# Patient Record
Sex: FEMALE | Race: WHITE | Employment: FULL TIME | URBAN - NONMETROPOLITAN AREA
[De-identification: names, ages, dates, MRNs, and addresses within clinical notes are randomized per-mention and may not be internally consistent; named-entity substitution may affect disease eponyms.]

---

## 2019-06-24 ENCOUNTER — OFFICE VISIT (OUTPATIENT)
Dept: FAMILY MEDICINE CLINIC | Age: 29
End: 2019-06-24
Payer: COMMERCIAL

## 2019-06-24 VITALS
BODY MASS INDEX: 35.84 KG/M2 | TEMPERATURE: 97 F | HEIGHT: 69 IN | SYSTOLIC BLOOD PRESSURE: 118 MMHG | HEART RATE: 70 BPM | WEIGHT: 242 LBS | OXYGEN SATURATION: 97 % | DIASTOLIC BLOOD PRESSURE: 84 MMHG

## 2019-06-24 DIAGNOSIS — L23.7 ALLERGIC DERMATITIS DUE TO POISON IVY: Primary | ICD-10-CM

## 2019-06-24 PROCEDURE — 99212 OFFICE O/P EST SF 10 MIN: CPT | Performed by: NURSE PRACTITIONER

## 2019-06-24 RX ORDER — METHYLPREDNISOLONE 4 MG/1
TABLET ORAL
Qty: 1 KIT | Refills: 0 | Status: SHIPPED | OUTPATIENT
Start: 2019-06-24 | End: 2019-06-25

## 2019-06-24 NOTE — PROGRESS NOTES
Subjective:  Chief Complaint   Patient presents with    Rash     possible poison ivy both legs face left hand started yesterday itches       HPI:  Patient states rash started 3 days ago. Patient was outside but not sure when she was exposed. The rash is pruritic in nature. No pain. The patient denies any other food, drug and or environmental allergens. Denies throat swelling or dyspnea. Denies fever or chills. Patient has been using OTC medications without improvement in symptoms. The patient has had previous episodes of poison ivy and this feels the same. Denies chance of pregnancy. ROS:  Positive and pertinent negatives as per HPI. All other systems are reviewed and negative. Current Outpatient Medications:     methylPREDNISolone (MEDROL DOSEPACK) 4 MG tablet, Take by mouth., Disp: 1 kit, Rfl: 0   No Known Allergies     Objective:  Vitals:    06/24/19 0917   BP: 118/84   Site: Left Upper Arm   Position: Sitting   Cuff Size: Large Adult   Pulse: 70   Temp: 97 °F (36.1 °C)   TempSrc: Temporal   SpO2: 97%   Weight: 242 lb (109.8 kg)   Height: 5' 9\" (1.753 m)        Exam:  Const: Appears healthy and well developed. No signs of acute distress present. Vitals reviewed per triage. Head/Face: Normocephalic, atraumatic. Facies is symmetric. ENMT:  Nares are patent. Buccal mucosa is moist.  No inflammation or edema of the posterior oropharynx. Neck: Trachea midline. Resp: No respiratory distress. Lungs clear to auscultation bilaterally all lung fieds. Musculo: Patient moves extremities without pain or limitation. Skin: Skin is warm and dry. The patient has multiple linear vesicular areas noted to the trunk and extremities. There is no evidence of petechiae or purpura rash. No pustules or open areas or signs of secondary infection noted. No target lesions. Neuro: Alert and oriented x3. Speech is articulate and fluent.   Psych: Mood/Affect: Patient's mood and affect is appropriate to

## 2019-06-25 ENCOUNTER — NURSE ONLY (OUTPATIENT)
Dept: FAMILY MEDICINE CLINIC | Age: 29
End: 2019-06-25
Payer: COMMERCIAL

## 2019-06-25 VITALS
SYSTOLIC BLOOD PRESSURE: 118 MMHG | WEIGHT: 238 LBS | DIASTOLIC BLOOD PRESSURE: 82 MMHG | OXYGEN SATURATION: 95 % | TEMPERATURE: 97.5 F | HEART RATE: 70 BPM | BODY MASS INDEX: 35.25 KG/M2 | HEIGHT: 69 IN

## 2019-06-25 DIAGNOSIS — L23.7 ALLERGIC DERMATITIS DUE TO POISON IVY: Primary | ICD-10-CM

## 2019-06-25 PROCEDURE — 96372 THER/PROPH/DIAG INJ SC/IM: CPT | Performed by: NURSE PRACTITIONER

## 2019-06-25 RX ORDER — METHYLPREDNISOLONE ACETATE 40 MG/ML
40 INJECTION, SUSPENSION INTRA-ARTICULAR; INTRALESIONAL; INTRAMUSCULAR; SOFT TISSUE ONCE
Status: COMPLETED | OUTPATIENT
Start: 2019-06-25 | End: 2019-06-25

## 2019-06-25 RX ADMIN — METHYLPREDNISOLONE ACETATE 40 MG: 40 INJECTION, SUSPENSION INTRA-ARTICULAR; INTRALESIONAL; INTRAMUSCULAR; SOFT TISSUE at 09:18

## 2021-03-08 PROBLEM — N83.8 DIMINISHED OVARIAN RESERVE: Status: ACTIVE | Noted: 2021-03-08

## 2023-07-07 ENCOUNTER — OFFICE VISIT (OUTPATIENT)
Dept: FAMILY MEDICINE CLINIC | Age: 33
End: 2023-07-07

## 2023-07-07 VITALS
WEIGHT: 239 LBS | DIASTOLIC BLOOD PRESSURE: 80 MMHG | RESPIRATION RATE: 16 BRPM | HEIGHT: 69 IN | BODY MASS INDEX: 35.4 KG/M2 | SYSTOLIC BLOOD PRESSURE: 118 MMHG | TEMPERATURE: 97.3 F | OXYGEN SATURATION: 99 % | HEART RATE: 78 BPM

## 2023-07-07 DIAGNOSIS — L30.9 DERMATITIS: Primary | ICD-10-CM

## 2023-07-07 PROCEDURE — 99213 OFFICE O/P EST LOW 20 MIN: CPT | Performed by: NURSE PRACTITIONER

## 2023-07-07 PROCEDURE — 96372 THER/PROPH/DIAG INJ SC/IM: CPT | Performed by: NURSE PRACTITIONER

## 2023-07-07 RX ORDER — PREDNISONE 10 MG/1
TABLET ORAL
Qty: 18 TABLET | Refills: 0 | Status: SHIPPED | OUTPATIENT
Start: 2023-07-07 | End: 2023-07-16

## 2023-07-07 RX ORDER — TRIAMCINOLONE ACETONIDE 40 MG/ML
40 INJECTION, SUSPENSION INTRA-ARTICULAR; INTRAMUSCULAR ONCE
Status: COMPLETED | OUTPATIENT
Start: 2023-07-07 | End: 2023-07-07

## 2023-07-07 RX ADMIN — TRIAMCINOLONE ACETONIDE 40 MG: 40 INJECTION, SUSPENSION INTRA-ARTICULAR; INTRAMUSCULAR at 10:21

## 2023-07-07 NOTE — PROGRESS NOTES
23  Maria R WANG Book : 1990 Sex: female  Age 34 y.o. Subjective:  Chief Complaint   Patient presents with    Poison Ivy     Arms, stomach, foot - about 2-3 days ago        HPI:   [address] , 34 y.o. female presents to the clinic for evaluation of rash to arms / legs / abdomen x 2-3 days. The patient reports associated pruritus. The patient reports symptoms developed after doing yard work. The patient has taken Cortisone Cream for symptoms. The patient reports worsening symptoms over time. Denies any known cause for the rash including any new soaps, detergents, lotions, foods, or medications. Denies recent travel, recent illness, and ill exposure. Denies any bleeding, drainage, lymphangitic streaking, arthralgia, myalgia,or lethargy. The patient also denies headache, fever, chest pain, abdominal pain, shortness of breath, and nausea / vomiting / diarrhea. ROS:   Unless otherwise stated in this report the patient's positive and negative responses for review of systems for constitutional, eyes, ENT, cardiovascular, respiratory, gastrointestinal, neurological, , musculoskeletal, and integument systems and related systems to the presenting problem are either stated in the history of present illness or were not pertinent or were negative for the symptoms and/or complaints related to the presenting medical problem. Positives and pertinent negatives as per HPI. All others reviewed and are negative.       PMH:     Past Medical History:   Diagnosis Date    Anxiety     Depression        Past Surgical History:   Procedure Laterality Date    BACK SURGERY  2017    SPINAL FUSION  2014    WISDOM TOOTH EXTRACTION         Family History   Problem Relation Age of Onset    Diabetes Mother     Depression Mother        Medications:     Current Outpatient Medications:     predniSONE (DELTASONE) 10 MG tablet, Take 1 tablet by mouth three times daily for 3 days, THEN 1 tablet 2 times daily for 3 days, THEN 1 tablet

## 2024-04-20 ENCOUNTER — HOSPITAL ENCOUNTER (EMERGENCY)
Age: 34
Discharge: HOME | End: 2024-04-20
Payer: COMMERCIAL

## 2024-04-20 ENCOUNTER — APPOINTMENT (OUTPATIENT)
Dept: ULTRASOUND IMAGING | Age: 34
End: 2024-04-20
Payer: COMMERCIAL

## 2024-04-20 VITALS
HEIGHT: 69 IN | HEART RATE: 62 BPM | TEMPERATURE: 97.2 F | SYSTOLIC BLOOD PRESSURE: 128 MMHG | RESPIRATION RATE: 15 BRPM | WEIGHT: 225 LBS | OXYGEN SATURATION: 100 % | BODY MASS INDEX: 33.33 KG/M2 | DIASTOLIC BLOOD PRESSURE: 75 MMHG

## 2024-04-20 DIAGNOSIS — R25.2 MUSCLE CRAMPING: ICD-10-CM

## 2024-04-20 DIAGNOSIS — M79.605 BILATERAL LEG PAIN: Primary | ICD-10-CM

## 2024-04-20 DIAGNOSIS — M79.604 BILATERAL LEG PAIN: Primary | ICD-10-CM

## 2024-04-20 LAB
ANION GAP SERPL CALCULATED.3IONS-SCNC: 11 MMOL/L (ref 7–16)
BASOPHILS # BLD: 0.04 K/UL (ref 0–0.2)
BASOPHILS NFR BLD: 1 % (ref 0–2)
BILIRUB UR QL STRIP: NEGATIVE
BUN SERPL-MCNC: 18 MG/DL (ref 6–20)
CALCIUM SERPL-MCNC: 9.8 MG/DL (ref 8.6–10.2)
CHLORIDE SERPL-SCNC: 100 MMOL/L (ref 98–107)
CK SERPL-CCNC: 71 U/L (ref 20–180)
CLARITY UR: CLEAR
CO2 SERPL-SCNC: 23 MMOL/L (ref 22–29)
COLOR UR: YELLOW
COMMENT: ABNORMAL
CREAT SERPL-MCNC: 0.8 MG/DL (ref 0.5–1)
EOSINOPHIL # BLD: 0.08 K/UL (ref 0.05–0.5)
EOSINOPHILS RELATIVE PERCENT: 1 % (ref 0–6)
ERYTHROCYTE [DISTWIDTH] IN BLOOD BY AUTOMATED COUNT: 12.4 % (ref 11.5–15)
GFR SERPL CREATININE-BSD FRML MDRD: >90 ML/MIN/1.73M2
GLUCOSE SERPL-MCNC: 88 MG/DL (ref 74–99)
GLUCOSE UR STRIP-MCNC: NEGATIVE MG/DL
HCG, URINE, POC: NEGATIVE
HCT VFR BLD AUTO: 42.8 % (ref 34–48)
HGB BLD-MCNC: 14.5 G/DL (ref 11.5–15.5)
HGB UR QL STRIP.AUTO: NEGATIVE
IMM GRANULOCYTES # BLD AUTO: 0.03 K/UL (ref 0–0.58)
IMM GRANULOCYTES NFR BLD: 0 % (ref 0–5)
KETONES UR STRIP-MCNC: NEGATIVE MG/DL
LEUKOCYTE ESTERASE UR QL STRIP: NEGATIVE
LYMPHOCYTES NFR BLD: 2.7 K/UL (ref 1.5–4)
LYMPHOCYTES RELATIVE PERCENT: 32 % (ref 20–42)
Lab: NORMAL
MAGNESIUM SERPL-MCNC: 1.8 MG/DL (ref 1.6–2.6)
MCH RBC QN AUTO: 28 PG (ref 26–35)
MCHC RBC AUTO-ENTMCNC: 33.9 G/DL (ref 32–34.5)
MCV RBC AUTO: 82.6 FL (ref 80–99.9)
MONOCYTES NFR BLD: 0.63 K/UL (ref 0.1–0.95)
MONOCYTES NFR BLD: 8 % (ref 2–12)
NEGATIVE QC PASS/FAIL: NORMAL
NEUTROPHILS NFR BLD: 59 % (ref 43–80)
NEUTS SEG NFR BLD: 4.92 K/UL (ref 1.8–7.3)
NITRITE UR QL STRIP: NEGATIVE
PH UR STRIP: 5.5 [PH] (ref 5–9)
PLATELET # BLD AUTO: 250 K/UL (ref 130–450)
PMV BLD AUTO: 9.9 FL (ref 7–12)
POSITIVE QC PASS/FAIL: NORMAL
POTASSIUM SERPL-SCNC: 4 MMOL/L (ref 3.5–5)
PROT UR STRIP-MCNC: NEGATIVE MG/DL
RBC # BLD AUTO: 5.18 M/UL (ref 3.5–5.5)
SODIUM SERPL-SCNC: 134 MMOL/L (ref 132–146)
SP GR UR STRIP: >1.03 (ref 1–1.03)
UROBILINOGEN UR STRIP-ACNC: 0.2 EU/DL (ref 0–1)
WBC OTHER # BLD: 8.4 K/UL (ref 4.5–11.5)

## 2024-04-20 PROCEDURE — 85025 COMPLETE CBC W/AUTO DIFF WBC: CPT

## 2024-04-20 PROCEDURE — 99284 EMERGENCY DEPT VISIT MOD MDM: CPT

## 2024-04-20 PROCEDURE — 81003 URINALYSIS AUTO W/O SCOPE: CPT

## 2024-04-20 PROCEDURE — 96374 THER/PROPH/DIAG INJ IV PUSH: CPT

## 2024-04-20 PROCEDURE — 2580000003 HC RX 258: Performed by: NURSE PRACTITIONER

## 2024-04-20 PROCEDURE — 83735 ASSAY OF MAGNESIUM: CPT

## 2024-04-20 PROCEDURE — 82550 ASSAY OF CK (CPK): CPT

## 2024-04-20 PROCEDURE — 93970 EXTREMITY STUDY: CPT

## 2024-04-20 PROCEDURE — 80048 BASIC METABOLIC PNL TOTAL CA: CPT

## 2024-04-20 PROCEDURE — 6360000002 HC RX W HCPCS: Performed by: NURSE PRACTITIONER

## 2024-04-20 PROCEDURE — 96372 THER/PROPH/DIAG INJ SC/IM: CPT

## 2024-04-20 RX ORDER — ORPHENADRINE CITRATE 30 MG/ML
60 INJECTION INTRAMUSCULAR; INTRAVENOUS ONCE
Status: COMPLETED | OUTPATIENT
Start: 2024-04-20 | End: 2024-04-20

## 2024-04-20 RX ORDER — METFORMIN HYDROCHLORIDE 750 MG/1
750 TABLET, EXTENDED RELEASE ORAL 2 TIMES DAILY
COMMUNITY

## 2024-04-20 RX ORDER — 0.9 % SODIUM CHLORIDE 0.9 %
1000 INTRAVENOUS SOLUTION INTRAVENOUS ONCE
Status: COMPLETED | OUTPATIENT
Start: 2024-04-20 | End: 2024-04-20

## 2024-04-20 RX ORDER — METHOCARBAMOL 500 MG/1
500 TABLET, FILM COATED ORAL 3 TIMES DAILY PRN
Qty: 12 TABLET | Refills: 0 | Status: SHIPPED | OUTPATIENT
Start: 2024-04-20

## 2024-04-20 RX ORDER — NAPROXEN 500 MG/1
500 TABLET ORAL 2 TIMES DAILY PRN
Qty: 14 TABLET | Refills: 0 | Status: SHIPPED | OUTPATIENT
Start: 2024-04-20 | End: 2024-04-27

## 2024-04-20 RX ORDER — KETOROLAC TROMETHAMINE 15 MG/ML
15 INJECTION, SOLUTION INTRAMUSCULAR; INTRAVENOUS ONCE
Status: COMPLETED | OUTPATIENT
Start: 2024-04-20 | End: 2024-04-20

## 2024-04-20 RX ADMIN — ORPHENADRINE CITRATE 60 MG: 30 INJECTION, SOLUTION INTRAMUSCULAR; INTRAVENOUS at 21:32

## 2024-04-20 RX ADMIN — KETOROLAC TROMETHAMINE 15 MG: 15 INJECTION, SOLUTION INTRAMUSCULAR; INTRAVENOUS at 18:48

## 2024-04-20 RX ADMIN — SODIUM CHLORIDE 1000 ML: 9 INJECTION, SOLUTION INTRAVENOUS at 21:00

## 2024-04-20 ASSESSMENT — PAIN DESCRIPTION - ORIENTATION
ORIENTATION: RIGHT;LEFT
ORIENTATION: RIGHT;LEFT

## 2024-04-20 ASSESSMENT — PAIN SCALES - GENERAL
PAINLEVEL_OUTOF10: 7
PAINLEVEL_OUTOF10: 6
PAINLEVEL_OUTOF10: 7

## 2024-04-20 ASSESSMENT — PAIN - FUNCTIONAL ASSESSMENT
PAIN_FUNCTIONAL_ASSESSMENT: ACTIVITIES ARE NOT PREVENTED
PAIN_FUNCTIONAL_ASSESSMENT: 0-10

## 2024-04-20 ASSESSMENT — PAIN DESCRIPTION - DESCRIPTORS: DESCRIPTORS: CRAMPING

## 2024-04-20 ASSESSMENT — PAIN DESCRIPTION - LOCATION
LOCATION: LEG
LOCATION: LEG

## 2024-04-20 NOTE — ED PROVIDER NOTES
Independent [facility] Visit.            [facility] EMERGENCY DEPARTMENT  ED  Encounter Note  Admit Date/RoomTime: 2024  5:27 PM  ED Room:   NAME: Maria R Lombardi  : 1990  MRN: [id number]  PCP: Ward Gonzales AuD    CHIEF COMPLAINT     Leg Pain (Bilateral leg pain/ cramping x 1 week, abdominal lap performed . Was seen at [facility] yesterday )    HISTORY OF PRESENT ILLNESS        Maria R Lombardi is a 33 y.o. female who presents to the ED by private vehicle with spouse for bilateral leg cramping in her anterior thighs and calfs all the way down her leg, beginning 1 week(s) ago.  She reports that she went to [facility] urgent care and they sent her in for ultrasounds to rule out DVTs.  The complaint has been persistent and gradually worsening and are moderate in severity.  Patient reports she had a laparoscopic procedure last week and an IVF clinic and she feels like her muscles are swollen and having cramping and getting tingling in her feet bilaterally.  She did take tramadol on Monday and Tuesday with no improvement.  She denies any fever, chills, chest pain, shortness of breath, dizziness, leg swelling, cough, hemoptysis, back pain, night sweats or any other symptoms.  Patient has a history of having a metal herminio in her back with screws approximately 7 years ago and has had no problems with paresthesias in her lower extremities since having the surgery and reports she does not have any complaints of back pain at this time..  She is G2, P0 and states that she is not on any hormone use denies any recent travel.  Patient states that she was diagnosed with adenomyosis.  She reports that her laparoscopic incisions are clean and dry no drainage and no pain.  She denies any hormone use or any recent travel.  Patient denies any history of DVTs.    PERC Rule for PE for Age <50:      Age ? 50 Negative     HR ? 100 Negative     O2 Sat on Room Air < 95% Negative     Prior History of  bilateral   Final Result   No evidence of DVT in either lower extremity.             ED COURSE   Vitals:    Vitals:    04/20/24 1703 04/20/24 2129 04/20/24 2132   BP: 131/83 (!) 124/93 128/75   Pulse: 75 66 62   Resp: 16 15    Temp: 97.2 °F (36.2 °C)     SpO2: 98% 100%    Weight: 102.1 kg (225 lb)     Height: 1.753 m (5' 9\")         Patient was given the following medications:  Medications   ketorolac (TORADOL) injection 15 mg (15 mg IntraVENous Given 4/20/24 1848)   sodium chloride 0.9 % bolus 1,000 mL (0 mLs IntraVENous Stopped 4/20/24 2203)   orphenadrine (NORFLEX) injection 60 mg (60 mg IntraMUSCular Given 4/20/24 2132)          PROCEDURES   none    REASSESSMENT   4/20/24       Time: 2012 reviewed results with patient she is still having cramping but no improvement of symptoms.  2124 patient having mild improvement she was ambulatory with no assistive devices.  Will give a muscle relaxant and send her home with a prescription and have her follow-up.        CONSULTS:  None      MDM: Maria R Lombardi is a 33 y.o. female who presents to the ED by private vehicle with spouse for bilateral leg cramping in her anterior thighs and calfs all the way down her leg, beginning 1 week(s) ago.  She reports that she went to [facility] urgent care and they sent her in for ultrasounds to rule out DVTs.  The complaint has been persistent and gradually worsening and are moderate in severity.  Patient reports she had a laparoscopic procedure last week and an IVF clinic and she feels like her muscles are swollen and having cramping and getting tingling in her feet bilaterally.  She did take tramadol on Monday and Tuesday with no improvement.  She denies any fever, chills, chest pain, shortness of breath, dizziness, leg swelling, cough, hemoptysis, back pain, night sweats or any other symptoms.  Patient has a history of having a metal herminio in her back with screws approximately 7 years ago and has had no problems with paresthesias in her

## 2024-04-21 NOTE — DISCHARGE INSTR - COC
Continuity of Care Form    Patient Name: Maria R Lombardi   :  1990  MRN:  [id number]    Admit date:  2024  Discharge date:  ***    Code Status Order: No Order   Advance Directives:     Admitting Physician:  No admitting provider for patient encounter.  PCP: Ward Gonzales AuD    Discharging Nurse: ***  Discharging Hospital Unit/Room#:   Discharging Unit Phone Number: ***    Emergency Contact:   Extended Emergency Contact Information  Primary Emergency Contact: Ki Lombardi  Address: [address] [place] [address] ROAD           [address] [place] [address]  Home Phone: [phone]  Work Phone: [phone]  Relation: Spouse  Secondary Emergency Contact: None,Per Pt  Relation: Other    Past Surgical History:  Past Surgical History:   Procedure Laterality Date    BACK SURGERY  2017    SPINAL FUSION  2014    WISDOM TOOTH EXTRACTION         Immunization History:     There is no immunization history on file for this patient.    Active Problems:  Patient Active Problem List   Diagnosis Code    Diminished ovarian reserve N83.8       Isolation/Infection:   Isolation            No Isolation          Patient Infection Status       None to display            Nurse Assessment:  Last Vital Signs: /75   Pulse 62   Temp 97.2 °F (36.2 °C)   Resp 15   Ht 1.753 m (5' 9\")   Wt 102.1 kg (225 lb)   SpO2 100%   BMI 33.23 kg/m²     Last documented pain score (0-10 scale): Pain Level: 6  Last Weight:   Wt Readings from Last 1 Encounters:   24 102.1 kg (225 lb)     Mental Status:  {IP PT MENTAL STATUS:57842}    IV Access:  { MARLENE IV ACCESS:154725192}    Nursing Mobility/ADLs:  Walking   {CHP DME ADLs:798666168}  Transfer  {CHP DME ADLs:515673766}  Bathing  {CHP DME ADLs:997549812}  Dressing  {CHP DME ADLs:295122659}  Toileting  {CHP DME ADLs:175318923}  Feeding  {CHP DME ADLs:445251505}  Med Admin  {CHP DME ADLs:881818204}  Med Delivery   { MARLENE MED Delivery:042845171}    Wound Care